# Patient Record
Sex: MALE | Race: WHITE | NOT HISPANIC OR LATINO
[De-identification: names, ages, dates, MRNs, and addresses within clinical notes are randomized per-mention and may not be internally consistent; named-entity substitution may affect disease eponyms.]

---

## 2023-01-23 ENCOUNTER — APPOINTMENT (OUTPATIENT)
Dept: PULMONOLOGY | Facility: CLINIC | Age: 53
End: 2023-01-23
Payer: MEDICAID

## 2023-01-31 ENCOUNTER — APPOINTMENT (OUTPATIENT)
Dept: PULMONOLOGY | Facility: CLINIC | Age: 53
End: 2023-01-31
Payer: MEDICAID

## 2023-01-31 VITALS
RESPIRATION RATE: 16 BRPM | HEART RATE: 78 BPM | HEIGHT: 69 IN | BODY MASS INDEX: 26.66 KG/M2 | SYSTOLIC BLOOD PRESSURE: 122 MMHG | DIASTOLIC BLOOD PRESSURE: 78 MMHG | OXYGEN SATURATION: 98 % | WEIGHT: 180 LBS

## 2023-01-31 DIAGNOSIS — E78.00 PURE HYPERCHOLESTEROLEMIA, UNSPECIFIED: ICD-10-CM

## 2023-01-31 DIAGNOSIS — Z80.1 FAMILY HISTORY OF MALIGNANT NEOPLASM OF TRACHEA, BRONCHUS AND LUNG: ICD-10-CM

## 2023-01-31 DIAGNOSIS — Z87.01 PERSONAL HISTORY OF PNEUMONIA (RECURRENT): ICD-10-CM

## 2023-01-31 DIAGNOSIS — Z09 ENCOUNTER FOR FOLLOW-UP EXAMINATION AFTER COMPLETED TREATMENT FOR CONDITIONS OTHER THAN MALIGNANT NEOPLASM: ICD-10-CM

## 2023-01-31 DIAGNOSIS — Z87.09 PERSONAL HISTORY OF OTHER DISEASES OF THE RESPIRATORY SYSTEM: ICD-10-CM

## 2023-01-31 DIAGNOSIS — Z72.0 TOBACCO USE: ICD-10-CM

## 2023-01-31 DIAGNOSIS — Z80.0 FAMILY HISTORY OF MALIGNANT NEOPLASM OF DIGESTIVE ORGANS: ICD-10-CM

## 2023-01-31 DIAGNOSIS — E11.9 TYPE 2 DIABETES MELLITUS W/OUT COMPLICATIONS: ICD-10-CM

## 2023-01-31 PROCEDURE — 99406 BEHAV CHNG SMOKING 3-10 MIN: CPT

## 2023-01-31 PROCEDURE — 99495 TRANSJ CARE MGMT MOD F2F 14D: CPT | Mod: 25

## 2023-01-31 PROCEDURE — G0296 VISIT TO DETERM LDCT ELIG: CPT

## 2023-01-31 RX ORDER — ALBUTEROL SULFATE 90 UG/1
108 (90 BASE) AEROSOL, METERED RESPIRATORY (INHALATION)
Qty: 1 | Refills: 5 | Status: ACTIVE | COMMUNITY
Start: 2023-01-31 | End: 1900-01-01

## 2023-01-31 NOTE — HISTORY OF PRESENT ILLNESS
[Current] : current [>= 20 pack years] : >= 20 pack years [TextBox_4] : 52-year-old male with a greater than 60-pack-year history of cigarette smoking seen today status post admission to [facility].  Patient was admitted on 1/13 and discharged on 1/19/2023 for hypoxic respiratory failure with increased cough and wheeze.  He was treated for severe COPD exacerbation complicated by influenza A and treated with Tamiflu, Breo, Spiriva and O2 which he weaned himself.  Presently still has equipment at home.  He feels nearly back to baseline.  He denies any complaints of increased cough wheeze or sputum.  He does feel significantly better since quitting smoking.  History is negative for leg edema, paroxysmal nocturnal dyspnea or orthopnea.\par  [TextBox_11] : 2 [TextBox_13] : 30

## 2023-01-31 NOTE — END OF VISIT
[FreeTextEntry3] : Records obtained from [facility] by me and reviewed. [Time Spent: ___ minutes] : I have spent [unfilled] minutes of time on the encounter.

## 2023-01-31 NOTE — DISCUSSION/SUMMARY
[FreeTextEntry1] : 52-year-old male seen today status post discharge from [facility] for COPD exacerbation complicated by influenza A.  No evidence of acute pneumonitis or suspicious lesions on CAT scan.  Hypoxemia resolved.  Clinically the patient most likely stage II to stage III.  His oxygen has been discontinued and the patient started on Trelegy.  He will follow-up in this office thereafter.

## 2023-01-31 NOTE — CONSULT LETTER
[Dear  ___] : Dear  [unfilled], [Consult Letter:] : I had the pleasure of evaluating your patient, [unfilled]. [Please see my note below.] : Please see my note below. [Consult Closing:] : Thank you very much for allowing me to participate in the care of this patient.  If you have any questions, please do not hesitate to contact me. [Sincerely,] : Sincerely, [FreeTextEntry3] : Romulo Tom MD FCCP\par Pulmonary/Critical Care/Sleep Medicine\par Department of Internal Medicine\par \par [facility] [employer]\par

## 2023-01-31 NOTE — COUNSELING
[Cessation strategies including cessation program discussed] : Cessation strategies including cessation program discussed [Use of nicotine replacement therapies and other medications discussed] : Use of nicotine replacement therapies and other medications discussed [Encouraged to pick a quit date and identify support needed to quit] : Encouraged to pick a quit date and identify support needed to quit [Smoking Cessation Program Referral] : Smoking Cessation Program Referral  [FreeTextEntry2] : Smoker [FreeTextEntry1] : 3 [ - Annual Lung Cancer Screening/Share Decision Making Discussion] : Annual Lung Cancer Screening/Share Decision Making Discussion. (I have advised this patient to have a Low Dose CT (LDCT) scan of the lungs and have discussed the following with the patient in a shared decision making discussion:   Benefits of Detection and Early Treatment: There is adequate evidence that annual screening for lung cancer with LDCT in a population of high-risk persons can prevent a substantial number of lung cancer–related deaths. The magnitude of benefit depends on the individual patient's risk for lung cancer, as those who are at highest risk are most likely to benefit. Screening cannot prevent most lung cancer–related deaths, and does not replace smoking cessation. Harms of Detection and Early Intervention and Treatment: The harms associated with LDCT screening include false-negative and false-positive results, incidental findings, over diagnosis, and radiation exposure. False-positive LDCT results occur in a substantial proportion of screened persons; 95% of all positive results do not lead to a diagnosis of cancer. In a high-quality screening program, further imaging can resolve most false-positive results; however, some patients may require invasive procedures. Radiation harms, including cancer resulting from cumulative exposure to radiation, vary depending on the age at the start of screening; the number of scans received; and the person's exposure to other sources of radiation, particularly other medical imaging.)

## 2023-03-21 ENCOUNTER — APPOINTMENT (OUTPATIENT)
Dept: PULMONOLOGY | Facility: CLINIC | Age: 53
End: 2023-03-21
Payer: MEDICAID

## 2023-03-21 VITALS
OXYGEN SATURATION: 97 % | RESPIRATION RATE: 16 BRPM | SYSTOLIC BLOOD PRESSURE: 120 MMHG | DIASTOLIC BLOOD PRESSURE: 80 MMHG | HEART RATE: 83 BPM

## 2023-03-21 VITALS — BODY MASS INDEX: 25.77 KG/M2 | HEIGHT: 69.5 IN | WEIGHT: 178 LBS

## 2023-03-21 PROCEDURE — 85018 HEMOGLOBIN: CPT | Mod: QW

## 2023-03-21 PROCEDURE — 94729 DIFFUSING CAPACITY: CPT

## 2023-03-21 PROCEDURE — G0296 VISIT TO DETERM LDCT ELIG: CPT

## 2023-03-21 PROCEDURE — 99214 OFFICE O/P EST MOD 30 MIN: CPT | Mod: 25

## 2023-03-21 PROCEDURE — 94727 GAS DIL/WSHOT DETER LNG VOL: CPT

## 2023-03-21 PROCEDURE — 94010 BREATHING CAPACITY TEST: CPT

## 2023-03-21 PROCEDURE — 99406 BEHAV CHNG SMOKING 3-10 MIN: CPT

## 2023-03-21 RX ORDER — METFORMIN HYDROCHLORIDE 500 MG/1
500 TABLET, COATED ORAL
Refills: 0 | Status: ACTIVE | COMMUNITY

## 2023-03-21 RX ORDER — ALBUTEROL SULFATE 2.5 MG/3ML
(2.5 MG/3ML) SOLUTION RESPIRATORY (INHALATION)
Qty: 1 | Refills: 5 | Status: ACTIVE | COMMUNITY
Start: 2023-03-21 | End: 1900-01-01

## 2023-03-21 RX ORDER — MORPHINE SULFATE 30 MG/1
TABLET ORAL
Refills: 0 | Status: ACTIVE | COMMUNITY

## 2023-03-21 NOTE — HISTORY OF PRESENT ILLNESS
[>= 20 pack years] : >= 20 pack years [Current] : current [TextBox_4] : 1/31/2023:\par 52-year-old male with a greater than 60-pack-year history of cigarette smoking seen today status post admission to [facility].  Patient was admitted on 1/13 and discharged on 1/19/2023 for hypoxic respiratory failure with increased cough and wheeze.  He was treated for severe COPD exacerbation complicated by influenza A and treated with Tamiflu, Breo, Spiriva and O2 which he weaned himself.  Presently still has equipment at home.  He feels nearly back to baseline.  He denies any complaints of increased cough wheeze or sputum.  He does feel significantly better since quitting smoking.  History is negative for leg edema, paroxysmal nocturnal dyspnea or orthopnea.\par \par Patient was felt to have a COPD exacerbation complicated by influenza A.  No evidence of interstitial lung disease or pneumonitis was seen.  He was started on Trelegy with pulmonary function tests on his next visit\par \par 3/21/2023:\par Compliant with Trelegy\par No Need for O2\par no cough wheeze SOB\par \par  [TextBox_11] : 2 [TextBox_13] : 30 [TextBox_29] : now 1/2 PPD

## 2023-03-21 NOTE — DISCUSSION/SUMMARY
[COPD] : chronic obstructive pulmonary disease [Improving] : improving [Responding to Treatment] : responding to treatment [Stage II (Moderate)] : stage II (moderate) [None] : There are no changes in medication management [Patient] : the patient [FreeTextEntry1] : Smoking cessation was discussed with the patient for approximately 10 minutes. This included the various options including self discontinuation, nicotine replacements, antidepressants, and nicotine antagonist/agonist (Chantix).\par \par Due to the patient's history of prior tobacco use they fulfill criteria for low dose, lung cancer screening. This method was described to the patient with criteria for greater than 20 pack years of smoking, over the age of 50, and screening for 15 years following cessation of tobacco use or until age 80\par

## 2023-03-21 NOTE — PROCEDURE
[___%] : current visit [unfilled]U% [FreeTextEntry1] : 3/21/2023: Lung volumes demonstrate an elevated RV, RV/TLC ratio with a normal TLC.  Diffusion capacity shows a mild reduction in absolute diffusion capacity with a moderate reduction in specific DL

## 2023-03-21 NOTE — CONSULT LETTER
[Dear  ___] : Dear  [unfilled], [Consult Letter:] : I had the pleasure of evaluating your patient, [unfilled]. [Please see my note below.] : Please see my note below. [Consult Closing:] : Thank you very much for allowing me to participate in the care of this patient.  If you have any questions, please do not hesitate to contact me. [Sincerely,] : Sincerely, [FreeTextEntry3] : Romulo Tom MD FCCP\par Pulmonary/Critical Care/Sleep Medicine\par Department of Internal Medicine\par \par [facility]\par

## 2023-09-20 ENCOUNTER — APPOINTMENT (OUTPATIENT)
Dept: PULMONOLOGY | Facility: CLINIC | Age: 53
End: 2023-09-20
Payer: MEDICAID

## 2023-09-20 VITALS — WEIGHT: 180 LBS | HEIGHT: 68 IN | BODY MASS INDEX: 27.28 KG/M2

## 2023-09-20 VITALS
RESPIRATION RATE: 16 BRPM | SYSTOLIC BLOOD PRESSURE: 122 MMHG | OXYGEN SATURATION: 96 % | HEART RATE: 84 BPM | DIASTOLIC BLOOD PRESSURE: 82 MMHG

## 2023-09-20 PROCEDURE — G0296 VISIT TO DETERM LDCT ELIG: CPT

## 2023-09-20 PROCEDURE — 94010 BREATHING CAPACITY TEST: CPT

## 2023-09-20 PROCEDURE — 99214 OFFICE O/P EST MOD 30 MIN: CPT | Mod: 25

## 2024-01-26 ENCOUNTER — NON-APPOINTMENT (OUTPATIENT)
Age: 54
End: 2024-01-26

## 2024-01-26 VITALS — WEIGHT: 180 LBS | BODY MASS INDEX: 27.28 KG/M2 | HEIGHT: 68 IN

## 2024-01-26 DIAGNOSIS — F17.210 NICOTINE DEPENDENCE, CIGARETTES, UNCOMPLICATED: ICD-10-CM

## 2024-01-26 NOTE — HISTORY OF PRESENT ILLNESS
[Current] : Current [TextBox_13] : Patient is scheduled for a baseline LDCT for lung cancer screening. Shared decision making managed and documented by Dr. Tom. Chart review performed to confirm eligibility for LDCT.   No documented personal history of lung cancer. No documented s/s of lung cancer. Family history of lung cancer: FATHER  Currently smoking 1/2 ppd   [PacksperDay] : 2 [N_Years] : 30 [PacksperYear] : 60

## 2024-02-26 ENCOUNTER — OUTPATIENT (OUTPATIENT)
Dept: OUTPATIENT SERVICES | Facility: HOSPITAL | Age: 54
LOS: 1 days | End: 2024-02-26
Payer: MEDICAID

## 2024-02-26 ENCOUNTER — APPOINTMENT (OUTPATIENT)
Dept: CT IMAGING | Facility: CLINIC | Age: 54
End: 2024-02-26
Payer: MEDICAID

## 2024-02-26 DIAGNOSIS — J44.9 CHRONIC OBSTRUCTIVE PULMONARY DISEASE, UNSPECIFIED: ICD-10-CM

## 2024-02-26 PROCEDURE — 71271 CT THORAX LUNG CANCER SCR C-: CPT

## 2024-02-26 PROCEDURE — 71271 CT THORAX LUNG CANCER SCR C-: CPT | Mod: 26

## 2024-03-07 ENCOUNTER — NON-APPOINTMENT (OUTPATIENT)
Age: 54
End: 2024-03-07

## 2024-03-19 ENCOUNTER — APPOINTMENT (OUTPATIENT)
Dept: PULMONOLOGY | Facility: CLINIC | Age: 54
End: 2024-03-19
Payer: MEDICAID

## 2024-03-19 VITALS
WEIGHT: 190 LBS | BODY MASS INDEX: 28.79 KG/M2 | RESPIRATION RATE: 16 BRPM | HEIGHT: 68 IN | DIASTOLIC BLOOD PRESSURE: 76 MMHG | OXYGEN SATURATION: 97 % | SYSTOLIC BLOOD PRESSURE: 120 MMHG | HEART RATE: 88 BPM

## 2024-03-19 PROCEDURE — 99215 OFFICE O/P EST HI 40 MIN: CPT

## 2024-03-19 PROCEDURE — G2211 COMPLEX E/M VISIT ADD ON: CPT | Mod: NC,1L

## 2024-03-19 RX ORDER — FLUTICASONE FUROATE, UMECLIDINIUM BROMIDE AND VILANTEROL TRIFENATATE 100; 62.5; 25 UG/1; UG/1; UG/1
100-62.5-25 POWDER RESPIRATORY (INHALATION)
Qty: 3 | Refills: 3 | Status: ACTIVE | COMMUNITY
Start: 2023-01-31 | End: 1900-01-01

## 2024-03-19 NOTE — PROCEDURE
[___%] : last visit [unfilled]U% [Spirometry] : stable [FreeTextEntry1] : 3/21/2023: Lung volumes demonstrate an elevated RV, RV/TLC ratio with a normal TLC.  Diffusion capacity shows a mild reduction in absolute diffusion capacity with a moderate reduction in specific DL

## 2024-03-19 NOTE — CONSULT LETTER
[Dear  ___] : Dear  [unfilled], [Consult Letter:] : I had the pleasure of evaluating your patient, [unfilled]. [Please see my note below.] : Please see my note below. [Consult Closing:] : Thank you very much for allowing me to participate in the care of this patient.  If you have any questions, please do not hesitate to contact me. [Sincerely,] : Sincerely, [FreeTextEntry3] : Romulo Tom MD FCCP\par  Pulmonary/Critical Care/Sleep Medicine\par  Department of Internal Medicine\par  \par  [facility] [employer]\par

## 2024-03-19 NOTE — END OF VISIT
[Time Spent: ___ minutes] : I have spent [unfilled] minutes of time on the encounter. [FreeTextEntry3] : Pacs rev with patient and wife

## 2024-03-19 NOTE — DISCUSSION/SUMMARY
[COPD] : chronic obstructive pulmonary disease [Improving] : improving [Responding to Treatment] : responding to treatment [Stage II (Moderate)] : stage II (moderate) [None] : There are no changes in medication management [Patient] : the patient [FreeTextEntry1] : Findings on CAT scan are somewhat suspicious.  Multiple pulmonary nodules may be indication of early bronchogenic carcinoma.  Complicating this may be respiratory bronchiolitis with interstitial lung disease.  Follow-up CAT scan is 3 months  Smoking cessation was discussed with the patient for approximately 10 minutes. This included the various options including self discontinuation, nicotine replacements, antidepressants, and nicotine antagonist/agonist (Chantix).  Due to the patient's history of prior tobacco use they fulfill criteria for low dose, lung cancer screening. This method was described to the patient with criteria for greater than 20 pack years of smoking, over the age of 50, and screening for 15 years following cessation of tobacco use or until age 80

## 2024-03-19 NOTE — HISTORY OF PRESENT ILLNESS
[Doing Well] : doing well [Difficulty Breathing During Exertion] : denies dyspnea on exertion [Feelings Of Weakness On Exertion] : denies exercise intolerance [Coughing Up Sputum] : denies coughing up sputum [Wheezing] : denies wheezing [Regional Soft Tissue Swelling Both Lower Extremities] : denies lower extremity edema [None] : None [PFTs] : pulmonary function tests [Adherent] : the patient is adherent with ~his/her~ medication regimen [Cough] : improved coughing [Fever] : no fever [de-identified] : spinal dz

## 2024-03-19 NOTE — COUNSELING
[Cessation strategies including cessation program discussed] : Cessation strategies including cessation program discussed [Use of nicotine replacement therapies and other medications discussed] : Use of nicotine replacement therapies and other medications discussed [Encouraged to pick a quit date and identify support needed to quit] : Encouraged to pick a quit date and identify support needed to quit [Smoking Cessation Program Referral] : Smoking Cessation Program Referral  [FreeTextEntry2] : Smoker [FreeTextEntry1] : 3

## 2024-03-21 ENCOUNTER — NON-APPOINTMENT (OUTPATIENT)
Age: 54
End: 2024-03-21

## 2024-05-14 ENCOUNTER — RESULT REVIEW (OUTPATIENT)
Age: 54
End: 2024-05-14

## 2024-05-28 ENCOUNTER — OUTPATIENT (OUTPATIENT)
Dept: OUTPATIENT SERVICES | Facility: HOSPITAL | Age: 54
LOS: 1 days | End: 2024-05-28
Payer: COMMERCIAL

## 2024-05-28 ENCOUNTER — APPOINTMENT (OUTPATIENT)
Dept: CT IMAGING | Facility: CLINIC | Age: 54
End: 2024-05-28
Payer: COMMERCIAL

## 2024-05-28 DIAGNOSIS — Z00.8 ENCOUNTER FOR OTHER GENERAL EXAMINATION: ICD-10-CM

## 2024-05-28 DIAGNOSIS — R91.8 OTHER NONSPECIFIC ABNORMAL FINDING OF LUNG FIELD: ICD-10-CM

## 2024-05-28 PROCEDURE — 71250 CT THORAX DX C-: CPT | Mod: 26

## 2024-05-28 PROCEDURE — 71250 CT THORAX DX C-: CPT

## 2024-06-19 ENCOUNTER — APPOINTMENT (OUTPATIENT)
Dept: PULMONOLOGY | Facility: CLINIC | Age: 54
End: 2024-06-19
Payer: COMMERCIAL

## 2024-06-19 VITALS
OXYGEN SATURATION: 97 % | HEART RATE: 81 BPM | DIASTOLIC BLOOD PRESSURE: 86 MMHG | SYSTOLIC BLOOD PRESSURE: 128 MMHG | RESPIRATION RATE: 16 BRPM

## 2024-06-19 VITALS — WEIGHT: 195 LBS | BODY MASS INDEX: 28.88 KG/M2 | HEIGHT: 69 IN

## 2024-06-19 DIAGNOSIS — J43.2 CENTRILOBULAR EMPHYSEMA: ICD-10-CM

## 2024-06-19 DIAGNOSIS — J44.9 CHRONIC OBSTRUCTIVE PULMONARY DISEASE, UNSPECIFIED: ICD-10-CM

## 2024-06-19 DIAGNOSIS — R91.8 OTHER NONSPECIFIC ABNORMAL FINDING OF LUNG FIELD: ICD-10-CM

## 2024-06-19 PROCEDURE — 94010 BREATHING CAPACITY TEST: CPT

## 2024-06-19 PROCEDURE — 99215 OFFICE O/P EST HI 40 MIN: CPT | Mod: 25

## 2024-06-19 RX ORDER — AMLODIPINE BESYLATE 5 MG/1
TABLET ORAL
Refills: 0 | Status: ACTIVE | COMMUNITY

## 2024-06-19 NOTE — CONSULT LETTER
[Dear  ___] : Dear  [unfilled], [Consult Letter:] : I had the pleasure of evaluating your patient, [unfilled]. [Please see my note below.] : Please see my note below. [Consult Closing:] : Thank you very much for allowing me to participate in the care of this patient.  If you have any questions, please do not hesitate to contact me. [Sincerely,] : Sincerely, [FreeTextEntry3] : Romulo Tom MD FCCP\par  Pulmonary/Critical Care/Sleep Medicine\par  Department of Internal Medicine\par  \par  [facility] [employer]\par

## 2024-06-19 NOTE — DISCUSSION/SUMMARY
[COPD] : chronic obstructive pulmonary disease [Responding to Treatment] : responding to treatment [Stage II (Moderate)] : stage II (moderate) [None] : There are no changes in medication management [Patient] : the patient [Stable] : stable [FreeTextEntry1] : New pulmonary nodules most likely inflammatory.  Follow-up CAT scan in 6 months.  Smoking cessation was discussed with the patient for approximately 10 minutes. This included the various options including self discontinuation, nicotine replacements, antidepressants, and nicotine antagonist/agonist (Chantix).  Due to the patient's history of prior tobacco use they fulfill criteria for low dose, lung cancer screening. This method was described to the patient with criteria for greater than 20 pack years of smoking, over the age of 50, and screening for 15 years following cessation of tobacco use or until age 80

## 2024-06-19 NOTE — RESULTS/DATA
[TextEntry] :  1/13/2023: [facility] chest x-ray-no acute pulmonary disease 1/14/2023: [facility] CT angiography-3 mm left upper lobe nodule, emphysematous changes, fatty liver, small hiatal hernia (films reviewed on PACS)    12/1/2022: [facility] chest CT without contrast parabronchial thickening and mucoid impaction with emphysema consistent with smoking.  No pneumonia.  4 mm fluid left upper lobe nodule most likely benign.  (Films reviewed on PACS)

## 2024-06-19 NOTE — HISTORY OF PRESENT ILLNESS
[Doing Well] : doing well [Difficulty Breathing During Exertion] : denies dyspnea on exertion [Feelings Of Weakness On Exertion] : denies exercise intolerance [Coughing Up Sputum] : denies coughing up sputum [Wheezing] : denies wheezing [Regional Soft Tissue Swelling Both Lower Extremities] : denies lower extremity edema [Adherent] : the patient is adherent with ~his/her~ medication regimen [PFTs] : pulmonary function tests [>= 20 pack years] : >= 20 pack years [None] : ~He/She~ has no significant interval events [Cough] : denies coughing [Fever] : no fever [de-identified] : spinal dz [TextBox_11] : 1 [TextBox_13] : 30 [TextBox_29] : stopped smoking 10 days ago

## 2024-11-07 ENCOUNTER — APPOINTMENT (OUTPATIENT)
Dept: CT IMAGING | Facility: CLINIC | Age: 54
End: 2024-11-07

## 2024-11-07 ENCOUNTER — OUTPATIENT (OUTPATIENT)
Dept: OUTPATIENT SERVICES | Facility: HOSPITAL | Age: 54
LOS: 1 days | End: 2024-11-07
Payer: COMMERCIAL

## 2024-11-07 DIAGNOSIS — Z00.8 ENCOUNTER FOR OTHER GENERAL EXAMINATION: ICD-10-CM

## 2024-11-07 PROCEDURE — 71250 CT THORAX DX C-: CPT

## 2024-11-07 PROCEDURE — 71250 CT THORAX DX C-: CPT | Mod: 26

## 2024-11-21 ENCOUNTER — APPOINTMENT (OUTPATIENT)
Dept: PULMONOLOGY | Facility: CLINIC | Age: 54
End: 2024-11-21
Payer: COMMERCIAL

## 2024-11-21 VITALS
RESPIRATION RATE: 16 BRPM | HEART RATE: 84 BPM | OXYGEN SATURATION: 97 % | HEIGHT: 69 IN | DIASTOLIC BLOOD PRESSURE: 80 MMHG | BODY MASS INDEX: 28.29 KG/M2 | SYSTOLIC BLOOD PRESSURE: 140 MMHG | WEIGHT: 191 LBS

## 2024-11-21 VITALS — BODY MASS INDEX: 28.29 KG/M2 | HEIGHT: 69 IN | WEIGHT: 191 LBS

## 2024-11-21 DIAGNOSIS — J43.2 CENTRILOBULAR EMPHYSEMA: ICD-10-CM

## 2024-11-21 DIAGNOSIS — R91.8 OTHER NONSPECIFIC ABNORMAL FINDING OF LUNG FIELD: ICD-10-CM

## 2024-11-21 DIAGNOSIS — Z72.0 TOBACCO USE: ICD-10-CM

## 2024-11-21 DIAGNOSIS — J44.9 CHRONIC OBSTRUCTIVE PULMONARY DISEASE, UNSPECIFIED: ICD-10-CM

## 2024-11-21 PROCEDURE — 94010 BREATHING CAPACITY TEST: CPT

## 2024-11-21 PROCEDURE — 99215 OFFICE O/P EST HI 40 MIN: CPT | Mod: 25

## 2024-11-21 PROCEDURE — G0296 VISIT TO DETERM LDCT ELIG: CPT
